# Patient Record
Sex: FEMALE | Employment: OTHER | URBAN - METROPOLITAN AREA
[De-identification: names, ages, dates, MRNs, and addresses within clinical notes are randomized per-mention and may not be internally consistent; named-entity substitution may affect disease eponyms.]

---

## 2018-11-21 ENCOUNTER — OFFICE VISIT (OUTPATIENT)
Dept: INTERNAL MEDICINE CLINIC | Age: 53
End: 2018-11-21

## 2018-11-21 VITALS
RESPIRATION RATE: 12 BRPM | TEMPERATURE: 97.2 F | OXYGEN SATURATION: 96 % | HEART RATE: 60 BPM | HEIGHT: 62 IN | BODY MASS INDEX: 41.59 KG/M2 | WEIGHT: 226 LBS | SYSTOLIC BLOOD PRESSURE: 155 MMHG | DIASTOLIC BLOOD PRESSURE: 95 MMHG

## 2018-11-21 DIAGNOSIS — Z11.59 NEED FOR HEPATITIS C SCREENING TEST: ICD-10-CM

## 2018-11-21 DIAGNOSIS — G57.11 MERALGIA PARESTHETICA OF RIGHT SIDE: ICD-10-CM

## 2018-11-21 DIAGNOSIS — E78.2 MIXED HYPERLIPIDEMIA: ICD-10-CM

## 2018-11-21 DIAGNOSIS — R03.0 TEMPORARY HIGH BLOOD PRESSURE: ICD-10-CM

## 2018-11-21 DIAGNOSIS — E66.01 OBESITY, CLASS III, BMI 40-49.9 (MORBID OBESITY) (HCC): ICD-10-CM

## 2018-11-21 DIAGNOSIS — Z13.0 SCREENING FOR DEFICIENCY ANEMIA: ICD-10-CM

## 2018-11-21 DIAGNOSIS — M54.32 SCIATICA OF LEFT SIDE: Primary | ICD-10-CM

## 2018-11-21 DIAGNOSIS — F32.A DEPRESSION, UNSPECIFIED DEPRESSION TYPE: ICD-10-CM

## 2018-11-21 RX ORDER — GABAPENTIN 300 MG/1
CAPSULE ORAL
Refills: 1 | COMMUNITY
Start: 2018-11-16 | End: 2018-11-21 | Stop reason: SDUPTHER

## 2018-11-21 RX ORDER — TIZANIDINE 2 MG/1
2 TABLET ORAL 3 TIMES DAILY
Qty: 45 TAB | Refills: 6 | Status: SHIPPED | OUTPATIENT
Start: 2018-11-21 | End: 2019-01-07 | Stop reason: SDUPTHER

## 2018-11-21 RX ORDER — VENLAFAXINE HYDROCHLORIDE 150 MG/1
150 CAPSULE, EXTENDED RELEASE ORAL DAILY
Qty: 90 CAP | Refills: 3 | Status: SHIPPED | OUTPATIENT
Start: 2018-11-21

## 2018-11-21 RX ORDER — VENLAFAXINE HYDROCHLORIDE 150 MG/1
CAPSULE, EXTENDED RELEASE ORAL
Refills: 0 | COMMUNITY
Start: 2018-11-16 | End: 2018-11-21 | Stop reason: SDUPTHER

## 2018-11-21 RX ORDER — GABAPENTIN 300 MG/1
600 CAPSULE ORAL 3 TIMES DAILY
Qty: 180 CAP | Refills: 5 | Status: SHIPPED | OUTPATIENT
Start: 2018-11-21

## 2018-11-21 NOTE — LETTER
Pepper Salinas :1965 MR #:[id number] 73 [facility] Page 1 of 5 CONTROLLED SUBSTANCE AGREEMENT I may be prescribed medications that are controlled substances as part  of my treatment plan for management of my medical condition(s). The goal of my treatment plan is to maintain and/or improve my health and wellbeing. Because controlled substances have an increased risk of abuse or harm, continual re-evaluation is needed determine if the goals of my treatment plan are being met for my safety and the safety of others. Bassam Alberts  am entering into this Controlled Substance Agreement with my provider, __________________________________ at [place] [address] . I understand that successful treatment requires mutual trust and honesty between me and my provider. I understand that there are state and federal laws and regulations which apply to the medications that my provider may prescribe that must be followed. I understand there are risks and benefits ts of taking the medicines that my provider may prescribe. I understand and agree that following this Agreement is necessary in continuing my provider-patient relationship and success of my treatment plan. As a part of my treatment plan, I agree to the following: COMMUNICATION: 
 
1. I will communicate fully with my provider about my medical condition(s), including the effect on my daily life and how well my medications are helping. I will tell my provider all of the medications that I take for any reason, including medications I receive from another health care provider, and will notify my provider about all issues, problems or concerns, including any side effects, which may be related to my medications. I understand that this information allows my provider to adjust my treatment plan to help manage my medical condition.  I understand that this information will become part of my permanent medical record. 2. I will notify my provider if I have a history of alcohol/drug misuse/addiction or if I have had treatment for alcohol/drug addiction in the past, or if I have a new problem with or concern about alcohol/drug use/addiction, because this increases the likelihood of high risk behaviors and may lead to serious medical conditions. 3. Females Only: I will notify my provider if I am or become pregnant, or if I intend to become pregnant, or if I intend to breastfeed. I understand that communication of these issues with my provider is important, due to possible effects my medication could have on an unborn fetus or breastfeeding child. Initials_____ Maximiliano Granda :1965 MR #:[id number] [address] [place] [address] Page 2 of 5 MISUSE OF MEDICATIONS / DRUGS: 
 
1. I agree to take all controlled substances as prescribed, and will not misuse or abuse any controlled substances prescribed by my provider. For my safety, I will not increase the amount of medicine I take without first talking with and getting permission from my provider. 2. If I have a medical emergency, another health care provider may prescribe me medication. If I seek emergency treatment, I will notify my provider within seventy-two (72) hours. 3. I understand that my provider may discuss my use and/or possible misuse/abuse of controlled substances and alcohol, as appropriate, with any health care provider involved in my care, pharmacist or legal authority. ILLEGAL DRUGS: 
 
1. I will not use illegal drugs of any kind, including but not limited to marijuana, heroin, cocaine, or any prescription drug which is not prescribed to me. DRUG DIVERSION / PRESCRIPTION FRAUD: 
 
1. I will not share, sell, trade, give away, or otherwise misuse my prescriptions or medications. 2. I will not alter any prescriptions provided to me by my provider. SINGLE PROVIDER: 
 
1. I agree that all controlled substances that I take will be prescribed only by my provider (or his/her covering provider) under this Agreement. This agreement does not prevent me from seeking emergency medical treatment or receiving pain management related to a surgery. PROTECTING MEDICATIONS: 
 
1. I am responsible for keeping my prescriptions and medications in a safe and secure place including safeguarding them from loss or theft. I understand that lost, stolen or damaged/destroyed prescriptions or medications will not be replaced. Initials____ Paulina Arellano :1965 MR #:[id number] [address] [place] [address] Page 3 of 5 PRESCRIPTION RENEWALS/REFILLS: 
 
1. I will follow my controlled substance medication schedule as prescribed by my provider. 2. I understand and agree that I will make any requests for renewals or refills of my prescriptions only at the time of an office visit or during my providers regular office hours subject to the prescription refill requirements of the individual practice. 3. I understand that my provider may not call in prescriptions for controlled substances to my pharmacy. 4. I understand that my provider may adjust or discontinue these medications as deemed appropriate for my medical treatment plan. This Agreement does not guarantee the prescription of controlled medications. 5. I agree that if my medications are adjusted or discontinued, I will properly dispose of any remaining medications. I understand that I will be required to dispose of any remaining controlled medications prior to being provided with any prescriptions for other controlled medications. 6. I understand that the renewal of my prescription depends on my medical condition, my consistent participation, and my adherence with my treatment plan and this Agreement.  
 
7. I understand that if I do not keep an appointment with my provider, I may not receive a renewal or refill for my controlled substance medication. PRESCRIPTION MONITORING / DRUG TESTIN. I understand that my provider may require me to provide urine, saliva or blood for testing at any time. I understand that this testing will be used to monitor for safety and adherence with my treatment plan and this Agreement. 2. I understand that my provider may ask me to provide an observed urine specimen, which means that a nurse or other health care provider may watch me provide urine, and I agree to cooperate if I am asked to provide an observed specimen. 3. I understand that if I do not provide urine, saliva or blood samples within two (2) hours of my providers request, or other timeframe decided by my provider, my treatment plan could be changed, or my prescriptions and medications may be changed or ended. 4. I understand that urine, saliva and blood test results will be a part of my permanent medical record. Initials_____ Griselda Vásquez :1965 MR #:[id number] 73 [facility] Page 4 of 5 
 
5. I understand that my provider is required to obtain a copy of my State Prescription Monitoring Program () Report at any time in order to safely prescribe medications. 6. I will bring all of my prescribed controlled substance medications in their original bottles to all of my scheduled appointments. 7. I understand that my provider may ask me to come to the practice with all of my prescribed medications for a random pill count at any time. I agree to cooperate if I am asked to come in for a random pill count. I understand that if I do not arrive in the timeframe decided by my provider, my treatment plan could be changed, or my prescriptions and medications may be changed or ended.  
 
COOPERATION WITH INVESTIGATIONS: 
 
1. I authorize my provider and my pharmacy to cooperate fully with any local, state, or federal law enforcement agency in the investigation of any possible misuse, sale, or other diversion of my controlled substance prescriptions or medications. RISKS: 
 
1. I understand that my level of consciousness may be affected from the use of controlled substances, and I understand that there are risks, benefits, effects and potential alternatives (including no treatment) to the medications that my provider has prescribed. 2. I understand that I may become drowsy, tired, dizzy, constipated, and sick to my stomach, or have changes in my mood or in my sleep while taking my medications. I have talked with my provider about these possible side effects, risks, benefits, and alternative treatments, and my provider has answered all of my questions. 3. I understand that I should not suddenly stop taking my medications without first speaking with my provider. I understand that if I suddenly stop taking my medications, I may experience nausea, vomiting, sweating,anxiety, sleeplessness, itching or other uncomfortable feelings. 4. I will not take my medications with alcohol of any kind, including beer, wine or liquor. I understand that drinking alcohol with my medications increases the chances of side effects, including breathing problems or even death. 5. I understand that if I have a history of alcoholism or other drug addiction I may be at increased risk of addiction to my medications. Signs of addiction might include craving, compulsive use, and continued use despite harm. Since addiction is a disease, I understand my provider may decide to change my medications and refer me to appropriate treatment services. I understand that this information would become part of my permanent medical record. Initials_____ Pepperantwan Serranoting :1965 MR #:[id number] 73 [facility] Page 5 of 5  
 
 
6.  Females only: Children born to women who regularly take controlled substances are likely to have physical problems and suffer withdrawal symptoms at birth. If I am of child-bearing age, I understand that I should use safe and effective birth control while taking any controlled substances to avoid the impact of medications on an unborn fetus or  child. I agree to notify my provider immediately if I should become pregnant so that my treatment plan can be adjusted. 7. Males only: I understand that chronic use of controlled substances has been associated with low testosterone levels in males which may affect my mood, stamina, sexual desire, and general health. I understand that my provider may order the appropriate laboratory test to determine my testosterone level,and I agree to this testing. ADHERENCE: 
 
1. I understand that if I do not adhere to this Agreement in any way, my provider may change my prescriptions, stop prescribing controlled substances or end our provider-patient relationship. 2. If my provider decides to stop prescribing medication, or decides to end our provider-patient relationship,my provider may require that I taper my medications slowly. If necessary, my provider may also provide a prescription for other medications to treat my withdrawal symptoms. UNDERSTANDING THIS AGREEMENT: 
 
I understand that my provider may adjust or stop my prescriptions for controlled substances based on my medical condition and my treatment plan. I understand that this Agreement does not guarantee that I will be prescribed medications or controlled substances. I understand that controlled substances may be just one part 
of my treatment plan. My initial on each page and my signature below shows that I have read each page of this Agreement, I have had an opportunity to ask questions, and all of my questions have been answered to my satisfaction by my provider.  
 
By signing below, I agree to comply with this Agreement, and I understand that if I do not follow the Agreements listed above, my provider may stop 
 
_________________________________________  Date/Time 11/21/2018 12:57 PM   
             (Patient Signature) 
 
________________________________________    Date/Time 11/21/2018 12:57 PM 
 (Parent or Guardian Signature if <18 yrs) 
 
_________________________________________ Date/Time 11/21/2018 12:57 PM 
 (Provider Signature)

## 2018-11-21 NOTE — PATIENT INSTRUCTIONS
Body Mass Index: Care Instructions Your Care Instructions Body mass index (BMI) can help you see if your weight is raising your risk for health problems. It uses a formula to compare how much you weigh with how tall you are. · A BMI lower than 18.5 is considered underweight. · A BMI between 18.5 and 24.9 is considered healthy. · A BMI between 25 and 29.9 is considered overweight. A BMI of 30 or higher is considered obese. If your BMI is in the normal range, it means that you have a lower risk for weight-related health problems. If your BMI is in the overweight or obese range, you may be at increased risk for weight-related health problems, such as high blood pressure, heart disease, stroke, arthritis or joint pain, and diabetes. If your BMI is in the underweight range, you may be at increased risk for health problems such as fatigue, lower protection (immunity) against illness, muscle loss, bone loss, hair loss, and hormone problems. BMI is just one measure of your risk for weight-related health problems. You may be at higher risk for health problems if you are not active, you eat an unhealthy diet, or you drink too much alcohol or use tobacco products. Follow-up care is a key part of your treatment and safety. Be sure to make and go to all appointments, and call your doctor if you are having problems. It's also a good idea to know your test results and keep a list of the medicines you take. How can you care for yourself at home? · Practice healthy eating habits. This includes eating plenty of fruits, vegetables, whole grains, lean protein, and low-fat dairy. · If your doctor recommends it, get more exercise. Walking is a good choice. Bit by bit, increase the amount you walk every day. Try for at least 30 minutes on most days of the week. · Do not smoke. Smoking can increase your risk for health problems.  If you need help quitting, talk to your doctor about stop-smoking programs and medicines. These can increase your chances of quitting for good. · Limit alcohol to 2 drinks a day for men and 1 drink a day for women. Too much alcohol can cause health problems. If you have a BMI higher than 25 · Your doctor may do other tests to check your risk for weight-related health problems. This may include measuring the distance around your waist. A waist measurement of more than 40 inches in men or 35 inches in women can increase the risk of weight-related health problems. · Talk with your doctor about steps you can take to stay healthy or improve your health. You may need to make lifestyle changes to lose weight and stay healthy, such as changing your diet and getting regular exercise. If you have a BMI lower than 18.5 · Your doctor may do other tests to check your risk for health problems. · Talk with your doctor about steps you can take to stay healthy or improve your health. You may need to make lifestyle changes to gain or maintain weight and stay healthy, such as getting more healthy foods in your diet and doing exercises to build muscle. Where can you learn more? Go to http://mu-matt.info/. Enter S176 in the search box to learn more about \"Body Mass Index: Care Instructions. \" Current as of: June 26, 2018 Content Version: 11.8 © 0886-4076 [employer]. Care instructions adapted under license by [employer] (which disclaims liability or warranty for this information). If you have questions about a medical condition or this instruction, always ask your healthcare professional. [address] any warranty or liability for your use of this information.

## 2018-11-21 NOTE — PROGRESS NOTES
INTERNISTS OF [facility]: 
12/2/2018, MRN: [id number] Walter Gastelum is a 52 y.o. female and presents to clinic to Establish Care; Medication Refill; and Depression Subjective: The pt is a 49yo with h/o obesity, depression, meralgia paresthetica (right), migraine HAs, tension HAs, left sciatica, and breast microcalcifications. 1. Depression: Present for yrs. She cries and is \"campbell\" if she misses a dose of Effexor. She's been on this rx x 10 yrs. No h/o suicidal ideation. She has never been hospitalized for depression. She used to take Zoloft but it was not effective in regards to controlling her sx. Her depression sx were associated with her menses. Later in life, she lost both parents within a 2 month period; her  eventually lost his job. Eventually her  passed away in 2012. Her children moved away and her grandchildren are in foster care. She lives with her niece and her . She cares for their kids. No tobacco/ETOH. No drug use. No energy drinks. She is not working. She identifies as a Shinto. She has yet to find a Muslim family. 2. Meralgia Paresthetica and Sciatica: She had a right knee replacement surgery last yr. Soon after she developed burning pain along her anterolateral right thigh. She had an EMG test that showed the \"pain is coming from the hip and not the back. \" She was placed on gabapentin. Sx were well controlled. 2-3 months ago, she developed lower back pain off/on that radiates down her LLE. No h/o trauma. Lower back pain occurs only with lying flat. No paresthesias. No weakness. No incontinence. No other alleviating factors are known. She has tried exedrin but this did not help her sx. No adverse side effects from this rx. Pain along her lower back/LLE w/o gabapentin is about 6/10. No drug use. 3. HAs: She gets 2-3 HAs per months. She's had tension HAs and migraine HAs for yrs. She has relief of sx with zanaflex.  She has bifrontal/temporal pain. No adverse side effects from the zanaflex rx but she has never taken it in the setting of gabapentin. No vision changes. She had her formal eye exam within the past yr. It was unremarkable per pt hx. She sleeps but wakes up every 2-3 hours 2/2 pain from #2. +Snoring hx but \"not loudly\" per family members. No emesis/nausea. No hearing loss. No auras. No triggers are known but they can occur more frequency in warmer months. 4. Health Maintenance: 
- Last PAP: S/p hysterectomy 2007 2/2 heavy bleeding. No vaginal bleeding.  
- Last mammogram: Last year and unremarkable per pt hx but she was told she has \"calcifications\" in one of her breasts. No breast pain/masses. - Colon cancer screening: She does the FIT test. It has never been abnormal per her hx. It was done within the past year. No hematochezia/melena. She has never had a colonoscopy and prefers not to have this done in place of the FIT test.  
- Diet: \"it's not good. \" Not a vegan/vegetarian. 
- It's been over a yr since she has had labs drawn. She is not on cholesterol lowering rx but states that her cholesterol was borderline elevated when checked in the past.  
- Exercise: She does not regularly exercise - Tobacco use: None 
- ETOH use: None - Drug use: None - Energy drink consumption: None - She moved from [place] to [place] in June 2018. 4. Temporary High Blood Pressure: She was diagnosed with HTN in the past but was taken off of rx. She used to be on cholesterol lowering rx but she was taken off this rx. She reports gaining weight within the past few months. Patient Active Problem List  
 Diagnosis Date Noted  Chronic migraine without aura without status migrainosus, not intractable 12/02/2018  Intractable episodic tension-type headache 12/02/2018  Meralgia paresthetica of right side 12/02/2018  Left sided sciatica 12/02/2018  Moderate episode of recurrent major depressive disorder ([facility] [address] 12/02/2018  Obesity, Class III, BMI 40-49.9 (morbid obesity) ([facility] [address] 12/02/2018  Mixed hyperlipidemia 12/02/2018 Current Outpatient Medications Medication Sig Dispense Refill  tiZANidine (ZANAFLEX) 2 mg tablet Take 1 Tab by mouth three (3) times daily. 45 Tab 6  
 gabapentin (NEURONTIN) 300 mg capsule Take 2 Caps by mouth three (3) times daily. 180 Cap 5  
 venlafaxine-SR (EFFEXOR-XR) 150 mg capsule Take 1 Cap by mouth daily. 90 Cap 3 Allergies Allergen Reactions  Vancomycin Hives and Rash  Avelox [Moxifloxacin] Hives Past Medical History:  
Diagnosis Date  Abnormal EKG  Arrhythmia  Depression  Headache  Hx of migraine headaches  Hypercholesterolemia  Hypertension  Insomnia Past Surgical History:  
Procedure Laterality Date  HX ADENOIDECTOMY  HX HYSTERECTOMY  2007  HX KNEE REPLACEMENT  2017  
 right  HX SEPTOPLASTY  2007  HX WISDOM TEETH EXTRACTION    
 x4 Family History Problem Relation Age of Onset [facility] Arthritis-osteo Mother  Stroke Mother  Glaucoma Mother  COPD Father  No Known Problems Sister  Elevated Lipids Brother  Macular Degen Brother  Depression Brother  Anemia Daughter Social History Tobacco Use  Smoking status: Never Smoker  Smokeless tobacco: Never Used Substance Use Topics  Alcohol use: No  
  Frequency: Never ROS Review of Systems Constitutional: Negative for chills and fever. HENT: Negative for ear pain and sore throat. Eyes: Negative for blurred vision and pain. Respiratory: Negative for cough and shortness of breath. Cardiovascular: Negative for chest pain. Gastrointestinal: Negative for abdominal pain, blood in stool and melena. Genitourinary: Negative for dysuria and hematuria. Musculoskeletal: Positive for back pain and joint pain. Negative for myalgias. Skin: Negative for rash. Neurological: Positive for tingling and headaches. Negative for focal weakness. Endo/Heme/Allergies: Does not bruise/bleed easily. Psychiatric/Behavioral: Positive for depression (see HPI). Negative for substance abuse and suicidal ideas. Objective Vitals:  
 11/21/18 1145 BP: (!) 155/95 Pulse: 60 Resp: 12 Temp: 97.2 °F (36.2 °C) TempSrc: Oral  
SpO2: 96% Weight: 226 lb (102.5 kg) Height: 5' 2.21\" (1.58 m) PainSc:   0 - No pain Physical Exam  
Constitutional: She is oriented to person, place, and time and well-developed, well-nourished, and in no distress. HENT:  
Head: Normocephalic and atraumatic. Right Ear: External ear normal.  
Left Ear: External ear normal.  
Nose: Nose normal.  
Mouth/Throat: Oropharynx is clear and moist. No oropharyngeal exudate. Eyes: Conjunctivae and EOM are normal. Pupils are equal, round, and reactive to light. Right eye exhibits no discharge. Left eye exhibits no discharge. No scleral icterus. Neck: Neck supple. Cardiovascular: Normal rate, regular rhythm, normal heart sounds and intact distal pulses. Exam reveals no gallop and no friction rub. No murmur heard. Pulmonary/Chest: Effort normal and breath sounds normal. No respiratory distress. She has no wheezes. She has no rales. Abdominal: Soft. Bowel sounds are normal. She exhibits no distension. There is no tenderness. There is no rebound and no guarding. Musculoskeletal: She exhibits no edema or tenderness (Bue). All spinous processes are nontender to palpation, except along her lumbar spine. No paraspinal muscles are tender to palpation. She has some discomfort with bilateral hip flexion/extension/rotation exercises. Her right thigh has altered sensation compared to her left thigh. Lymphadenopathy:  
  She has no cervical adenopathy. Neurological: She is alert and oriented to person, place, and time. She exhibits normal muscle tone.  Gait normal.  
 Positive left-sided straight leg raise test.  
Skin: Skin is warm and dry. No erythema. Psychiatric: Affect normal.  
Nursing note and vitals reviewed. Assessment/Plan: 1. Mixed hyperlipidemia Checking a BMP, lipid panel, and A1c. 
I encouraged her to reduce her weight by lowering her process food intake. I will recheck her weight at her follow-up appointment. ORDERS: 
- METABOLIC PANEL, BASIC; Future - LIPID PANEL; Future 
- HEMOGLOBIN A1C W/O EAG; Future 2. Health Maintenance: Screen for anemia with a CBC. Requesting previous PCP records, her vaccine records, and cancer screening records.  Screening for hepatitis C per CDC recommendations based on her date of birth. - She declined getting the flu vaccine today ORDERS: 
- CBC WITH AUTOMATED DIFF; Future 
- HEPATITIS C AB; Future 3. Right Meralgia Paresthetica and Sciatica of left side:  
Placing a referral to Orthopedics. Activity as tolerated.  Refilling her gabapentin and tizanidine. A controlled substance agreement was completed today. I instructed her to stop these medications and notify me if she develops any adverse side effects while taking them. ORDERS: 
- REFERRAL TO ORTHOPEDICS 
- XR SPINE LUMB COMP W BEND; Future - tiZANidine (ZANAFLEX) 2 mg tablet; Take 1 Tab by mouth three (3) times daily. Dispense: 45 Tab; Refill: 6 
- gabapentin (NEURONTIN) 300 mg capsule; Take 2 Caps by mouth three (3) times daily. Dispense: 180 Cap; Refill: 5 
 
4. Depression: Stable. Refilling her Effexor. I encouraged her in order for underlying belief system as a means to reduce her stress. ORDERS: 
- venlafaxine-SR (EFFEXOR-XR) 150 mg capsule; Take 1 Cap by mouth daily. Dispense: 90 Cap; Refill: 3 
 
5. Temporary Elevated BP: 
- RTC for a BP check. If her BP is >140/90, I will need to order antihypertensive rx. Health Maintenance Due Topic Date Due  
 Hepatitis C Screening  1965  DTaP/Tdap/Td series (1 - Tdap) 03/07/1984  Shingrix Vaccine Age 50> (1 of 2) 03/07/2013  BREAST CANCER SCRN MAMMOGRAM  03/07/2013  FOBT Q 1 YEAR AGE 50-75  03/07/2013  Influenza Age 5 to Adult  08/01/2018 I have discussed the diagnosis with the patient and the intended plan as seen in the above orders. The patient has received an after-visit summary and questions were answered concerning future plans. I have discussed medication side effects and warnings with the patient as well. I have reviewed the plan of care with the patient, accepted their input and they are in agreement with the treatment goals. All questions were answered. The patient understands the plan of care. Handouts provided today with above information. Pt instructed if symptoms worsen to call the office or report to the ED for continued care. Greater than 50% of the visit time was spent in counseling and/or coordination of care. Voice recognition was used to generate this report, which may have resulted in some phonetic based errors in grammar and contents. Even though attempts were made to correct all the mistakes, some may have been missed, and remained in the body of the document. Follow-up Disposition: 
Return in about 7 weeks (around 1/9/2019) for sciatica, chronic pain, depression, lab results. Stacey Oconnell MD

## 2018-11-21 NOTE — PROGRESS NOTES
Chief Complaint Patient presents with [facility] Establish Care  Medication Refill  Depression 1. Have you been to the ER, urgent care clinic since your last visit? Hospitalized since your last visit? No 
 
2. Have you seen or consulted any other health care providers outside of the [address] [place] [address] since your last visit? Include any pap smears or colon screening. No 
 
Patient was given a copy of the Advanced Directive and understands to bring it in once completed. Health Maintenance Due Topic Date Due  
 Hepatitis C Screening  1965  DTaP/Tdap/Td series (1 - Tdap) 03/07/1984  PAP AKA CERVICAL CYTOLOGY  03/07/1984  Shingrix Vaccine Age 50> (1 of 2) 03/07/2013  BREAST CANCER SCRN MAMMOGRAM  03/07/2013  FOBT Q 1 YEAR AGE 50-75  03/07/2013  Influenza Age 5 to Adult  08/01/2018

## 2018-12-02 PROBLEM — G57.11 MERALGIA PARESTHETICA OF RIGHT SIDE: Status: ACTIVE | Noted: 2018-12-02

## 2018-12-02 PROBLEM — E78.2 MIXED HYPERLIPIDEMIA: Status: ACTIVE | Noted: 2018-12-02

## 2018-12-02 PROBLEM — G44.211 INTRACTABLE EPISODIC TENSION-TYPE HEADACHE: Status: ACTIVE | Noted: 2018-12-02

## 2018-12-02 PROBLEM — F33.1 MODERATE EPISODE OF RECURRENT MAJOR DEPRESSIVE DISORDER (HCC): Status: ACTIVE | Noted: 2018-12-02

## 2018-12-02 PROBLEM — M54.32 LEFT SIDED SCIATICA: Status: ACTIVE | Noted: 2018-12-02

## 2018-12-02 PROBLEM — E66.01 OBESITY, CLASS III, BMI 40-49.9 (MORBID OBESITY) (HCC): Status: ACTIVE | Noted: 2018-12-02

## 2018-12-02 PROBLEM — F32.A DEPRESSION: Status: ACTIVE | Noted: 2018-12-02

## 2018-12-02 PROBLEM — G43.709 CHRONIC MIGRAINE WITHOUT AURA WITHOUT STATUS MIGRAINOSUS, NOT INTRACTABLE: Status: ACTIVE | Noted: 2018-12-02

## 2018-12-05 ENCOUNTER — TELEPHONE (OUTPATIENT)
Dept: INTERNAL MEDICINE CLINIC | Age: 53
End: 2018-12-05

## 2018-12-05 ENCOUNTER — HOSPITAL ENCOUNTER (OUTPATIENT)
Dept: GENERAL RADIOLOGY | Age: 53
Discharge: HOME | End: 2018-12-05
Payer: COMMERCIAL

## 2018-12-05 ENCOUNTER — APPOINTMENT (OUTPATIENT)
Dept: INTERNAL MEDICINE CLINIC | Age: 53
End: 2018-12-05

## 2018-12-05 DIAGNOSIS — M54.32 SCIATICA OF LEFT SIDE: ICD-10-CM

## 2018-12-05 PROCEDURE — 72114 X-RAY EXAM L-S SPINE BENDING: CPT

## 2018-12-05 NOTE — TELEPHONE ENCOUNTER
----- Message from Emmett Barthel, MD sent at 12/5/2018 11:57 AM EST -----  Please let her know that her lumbar xray shows some curvature along her lower spine (scoliosis). She also has findings consistent with underlying arthritis and disc disease. She needs to f/u with Orthopedics as discussed at her last office visit.     Dr. Merced Mcintosh  [facility], [address] [place] [address] Str.  Phone: [phone]  Fax: [phone]

## 2018-12-05 NOTE — TELEPHONE ENCOUNTER
----- Message from Paul Clarke MD sent at 12/5/2018 11:57 AM EST -----  Please let her know that her lumbar xray shows some curvature along her lower spine (scoliosis). She also has findings consistent with underlying arthritis and disc disease. She needs to f/u with Orthopedics as discussed at her last office visit.     Dr. Bettina Fitzpatrick  [facility] of [address], [address] [place] [address] Str.  Phone: [phone]  Fax: [phone]

## 2018-12-05 NOTE — TELEPHONE ENCOUNTER
Chief Complaint   Patient presents with    Results     done 12-05-18 Xray Lumbar Complete with BEND per Dr Duval Mems     Left voice message for the patient to return my call.

## 2018-12-05 NOTE — PROGRESS NOTES
Please let her know that her lumbar xray shows some curvature along her lower spine (scoliosis). She also has findings consistent with underlying arthritis and disc disease. She needs to f/u with Orthopedics as discussed at her last office visit.     Dr. Fransisco Johns  [facility] of [address]  Phone: [phone]  Fax: [phone]

## 2018-12-06 LAB
ABSOLUTE LYMPHOCYTE COUNT, 10803: 2 K/UL (ref 1–4.8)
ANION GAP SERPL CALC-SCNC: 17 MMOL/L
AVG GLU, 10930: 108 MG/DL (ref 91–123)
BASOPHILS # BLD: 0 K/UL (ref 0–0.2)
BASOPHILS NFR BLD: 1 % (ref 0–2)
BUN SERPL-MCNC: 9 MG/DL (ref 6–22)
CALCIUM SERPL-MCNC: 9.4 MG/DL (ref 8.4–10.5)
CHLORIDE SERPL-SCNC: 99 MMOL/L (ref 98–110)
CHOLEST SERPL-MCNC: 270 MG/DL (ref 110–200)
CO2 SERPL-SCNC: 26 MMOL/L (ref 20–32)
CREAT SERPL-MCNC: 0.8 MG/DL (ref 0.5–1.2)
EOSINOPHIL # BLD: 0.3 K/UL (ref 0–0.5)
EOSINOPHIL NFR BLD: 5 % (ref 0–6)
ERYTHROCYTE [DISTWIDTH] IN BLOOD BY AUTOMATED COUNT: 13.5 % (ref 10–15.5)
GFRAA, 66117: >60
GFRNA, 66118: >60
GLUCOSE SERPL-MCNC: 93 MG/DL (ref 70–99)
GRANULOCYTES,GRANS: 59 % (ref 40–75)
HBA1C MFR BLD HPLC: 5.4 % (ref 4.8–5.9)
HCT VFR BLD AUTO: 48.2 % (ref 35.1–48)
HCV AB SER IA-ACNC: NORMAL
HDLC SERPL-MCNC: 4.6 MG/DL (ref 0–5)
HDLC SERPL-MCNC: 59 MG/DL (ref 40–59)
HGB BLD-MCNC: 14.9 G/DL (ref 11.7–16)
LDLC SERPL CALC-MCNC: 169 MG/DL (ref 50–99)
LYMPHOCYTES, LYMLT: 31 % (ref 20–45)
MCH RBC QN AUTO: 29 PG (ref 26–34)
MCHC RBC AUTO-ENTMCNC: 31 G/DL (ref 31–36)
MCV RBC AUTO: 93 FL (ref 80–95)
MONOCYTES # BLD: 0.3 K/UL (ref 0.1–1)
MONOCYTES NFR BLD: 5 % (ref 3–12)
NEUTROPHILS # BLD AUTO: 3.9 K/UL (ref 1.8–7.7)
PLATELET # BLD AUTO: 270 K/UL (ref 140–440)
PMV BLD AUTO: 11.1 FL (ref 9–13)
POTASSIUM SERPL-SCNC: 4.5 MMOL/L (ref 3.5–5.5)
RBC # BLD AUTO: 5.21 M/UL (ref 3.8–5.2)
SODIUM SERPL-SCNC: 142 MMOL/L (ref 133–145)
TRIGL SERPL-MCNC: 213 MG/DL (ref 40–149)
VLDLC SERPL CALC-MCNC: 43 MG/DL (ref 8–30)
WBC # BLD AUTO: 6.6 K/UL (ref 4–11)

## 2018-12-06 NOTE — TELEPHONE ENCOUNTER
Chief Complaint   Patient presents with    Results     done 12-05-18 Xray Lumbar Complete with BEND per Dr Irina Eid     Patient reached and informed of results, she states she has already made her Orthopedic Spine appointment for 1-23-18, and understands to follow up with their facility.

## 2018-12-14 ENCOUNTER — TELEPHONE (OUTPATIENT)
Dept: INTERNAL MEDICINE CLINIC | Age: 53
End: 2018-12-14

## 2018-12-14 NOTE — PROGRESS NOTES
Please let her know that her renal function is normal. Her triglycerides are 213. Her HDL is 59. Her total cholesterol is 270. Her LDL is 169. She should be on cholesterol lowering medication - which we can discuss when she returns to clinic. Meanwhile, she does not have hepatitis C infection. Her blood counts are normal. Her labs show no evidence of diabetes/prediabetes at this time.     Dr. Verónica Herrera  [facility], [address] [place] [address], [address] [place] [address] Str.  Phone: [phone]  Fax: [phone]

## 2018-12-14 NOTE — TELEPHONE ENCOUNTER
----- Message from Davon Kirkland MD sent at 12/14/2018  3:51 AM EST -----  Please let her know that her renal function is normal. Her triglycerides are 213. Her HDL is 59. Her total cholesterol is 270. Her LDL is 169. She should be on cholesterol lowering medication - which we can discuss when she returns to clinic. Meanwhile, she does not have hepatitis C infection. Her blood counts are normal. Her labs show no evidence of diabetes/prediabetes at this time.     Dr. Arun Finney  [facility] of [address], [address] [place] [address] Str.  Phone: [phone]  Fax: [phone]

## 2018-12-17 NOTE — TELEPHONE ENCOUNTER
Patient contacted, patient identified with two identifiers (Name & ). Patient given results per DR. Alpesh Mendoza.

## 2018-12-19 ENCOUNTER — TELEPHONE (OUTPATIENT)
Dept: INTERNAL MEDICINE CLINIC | Age: 53
End: 2018-12-19

## 2018-12-19 NOTE — TELEPHONE ENCOUNTER
Chief Complaint   Patient presents with    Back Pain     per DR Rachel Powell the patient is to contact Orthopedics for additional Treatment      Per Dr Rachel Powell the patient was reached, and informed she needs to contact the Orthopedic Specialist for evaluation and treatment. The patient stated she has an appointment but not until 01-23-19, and advised to reach out, and call their office daily to see if there are any openings, and that they will try to put her in those openings if available, but she would have to be diligent in calling their office for an earlier appointment. All understood.

## 2018-12-19 NOTE — TELEPHONE ENCOUNTER
Please have her contact Orthopedics for additional treatment recommendations. She is already on a good dose of gabapentin and tizanidine. If NSAIDs are not helping to relieve any break-through pain, she needs to f/u with Orthopedics as was discussed at her last apt.     Dr. Maty Laura  [facility] of [address] [place] [address], [address] [place] [address], [address] [place] [address] Str.  Phone: [phone]  Fax: [phone]

## 2019-01-07 ENCOUNTER — OFFICE VISIT (OUTPATIENT)
Dept: INTERNAL MEDICINE CLINIC | Age: 54
End: 2019-01-07

## 2019-01-07 VITALS
DIASTOLIC BLOOD PRESSURE: 97 MMHG | SYSTOLIC BLOOD PRESSURE: 160 MMHG | WEIGHT: 232 LBS | BODY MASS INDEX: 42.69 KG/M2 | TEMPERATURE: 98.3 F | HEIGHT: 62 IN | OXYGEN SATURATION: 95 % | HEART RATE: 86 BPM | RESPIRATION RATE: 12 BRPM

## 2019-01-07 DIAGNOSIS — Z12.31 ENCOUNTER FOR SCREENING MAMMOGRAM FOR BREAST CANCER: ICD-10-CM

## 2019-01-07 DIAGNOSIS — E78.2 MIXED HYPERLIPIDEMIA: ICD-10-CM

## 2019-01-07 DIAGNOSIS — I10 ESSENTIAL HYPERTENSION: Primary | ICD-10-CM

## 2019-01-07 DIAGNOSIS — G43.709 CHRONIC MIGRAINE WITHOUT AURA WITHOUT STATUS MIGRAINOSUS, NOT INTRACTABLE: ICD-10-CM

## 2019-01-07 DIAGNOSIS — M54.32 SCIATICA OF LEFT SIDE: ICD-10-CM

## 2019-01-07 RX ORDER — AMLODIPINE BESYLATE 5 MG/1
5 TABLET ORAL DAILY
Qty: 30 TAB | Refills: 6 | Status: SHIPPED | OUTPATIENT
Start: 2019-01-07

## 2019-01-07 RX ORDER — PRAVASTATIN SODIUM 20 MG/1
20 TABLET ORAL
Qty: 30 TAB | Refills: 6 | Status: SHIPPED | OUTPATIENT
Start: 2019-01-07 | End: 2019-07-25 | Stop reason: SDUPTHER

## 2019-01-07 RX ORDER — TIZANIDINE 4 MG/1
4 TABLET ORAL 3 TIMES DAILY
Qty: 45 TAB | Refills: 6 | Status: SHIPPED | OUTPATIENT
Start: 2019-01-07

## 2019-01-07 NOTE — PROGRESS NOTES
INTERNISTS OF [facility]: 
1/8/2019, MRN: [id number] Charmayne Loge is a 52 y.o. female and presents to clinic for Elevated Blood Pressure (follow up) and Labs (done 12-05-18 ) Subjective: The pt is a 49yo with h/o obesity, depression (not responsive to zoloft), meralgia paresthetica (right), migraine HAs, HLD, allergic rhinitis tension HAs, left sciatica, and breast microcalcifications. 
  
1. Cough: Present for 1-2 weeks. Other family members have had similar symptoms. She is living with her niece and her niece's , taking care of their children. She has a productive cough with white sputum. Sometimes it is clear. She has no shortness of breath. Symptoms are worse at night. She denies fever and chills. She has chronic allergic rhinitis and was followed by an allergy doctor. No alleviating factors are known. 2.  Hypertension: Her blood pressure at her last visit was elevated at 155/95. It is elevated today as well at 160/97 without medication. Her weight is 232 pounds. She is not regularly exercising or dieting. She used to take blood pressure medication. 3. H/o Migraine HAs and Chronic Back Pain: Present for years. She was diagnosed with migraine and tension headaches in the past.  She has significant relief of symptoms with Zanaflex. She responds best to 4 mg of Zanaflex at bedtime. She reports no adverse side effects with taking this medication. No drug use. No energy drink consumption. Headaches are bifrontal/temporal in location. Pain is most prominent along the left side of her neck. She denies vision changes. No hearing deficits. No triggers are known for her headaches. She gets to 3 headaches on average per month. She is asking for her tizanidine to be increased to 4 mg at bedtime. She also has a history of meralgia paresthetica and sciatica.   She has history of a burning anterolateral right thigh pain thought to be secondary to right meralgia paresthetica. She also has a history of disc disease per her most recent x-ray findings. She has chronic back pain along her lower back that radiates down her left lower extremity. She is scheduled to meet with Orthopedics. She is taking gabapentin and reports no adverse side effects with taking this medication. 4. Health Maintenance: 
- Last mammogram: Done >1 yr ago per her hx. She was told that she has \"calcifications\" in one of her breasts. No breast pain/masses. - Colon cancer screening: She does the FIT test. It has never been abnormal per her hx. It was done within the past year. No hematochezia/melena. She has never had a colonoscopy and prefers not to have this done in place of the FIT test.  
 
5.  Hyperlipidemia: Her most recent labs show an elevated LDL of 169. She is not on any cholesterol-lowering medication at this time. Patient Active Problem List  
 Diagnosis Date Noted  Chronic migraine without aura without status migrainosus, not intractable 12/02/2018  Intractable episodic tension-type headache 12/02/2018  Meralgia paresthetica of right side 12/02/2018  Left sided sciatica 12/02/2018  Moderate episode of recurrent major depressive disorder ([facility] [address] 12/02/2018  Obesity, Class III, BMI 40-49.9 (morbid obesity) ([facility] [address] 12/02/2018  Mixed hyperlipidemia 12/02/2018 Current Outpatient Medications Medication Sig Dispense Refill  amLODIPine (NORVASC) 5 mg tablet Take 1 Tab by mouth daily. 30 Tab 6  tiZANidine (ZANAFLEX) 4 mg tablet Take 1 Tab by mouth three (3) times daily. 45 Tab 6  pravastatin (PRAVACHOL) 20 mg tablet Take 1 Tab by mouth nightly. For high cholesterol 30 Tab 6  
 gabapentin (NEURONTIN) 300 mg capsule Take 2 Caps by mouth three (3) times daily. 180 Cap 5  
 venlafaxine-SR (EFFEXOR-XR) 150 mg capsule Take 1 Cap by mouth daily. 90 Cap 3 Allergies Allergen Reactions  Vancomycin Hives and Rash  Avelox [Moxifloxacin] Hives Past Medical History:  
Diagnosis Date  Abnormal EKG  Arrhythmia  Depression  Headache  Hx of migraine headaches  Hypercholesterolemia  Hypertension  Insomnia Past Surgical History:  
Procedure Laterality Date  HX ADENOIDECTOMY  HX HYSTERECTOMY  2007  HX KNEE REPLACEMENT  2017  
 right  HX SEPTOPLASTY  2007  HX WISDOM TEETH EXTRACTION    
 x4 Family History Problem Relation Age of Onset [facility] Arthritis-osteo Mother  Stroke Mother  Glaucoma Mother  COPD Father  No Known Problems Sister  Elevated Lipids Brother  Macular Degen Brother  Depression Brother  Anemia Daughter Social History Tobacco Use  Smoking status: Never Smoker  Smokeless tobacco: Never Used Substance Use Topics  Alcohol use: No  
  Frequency: Never ROS Review of Systems Constitutional: Negative for chills and fever. HENT: Negative for ear pain and sore throat. Eyes: Negative for blurred vision and pain. Respiratory: Positive for cough and sputum production. Negative for shortness of breath. Cardiovascular: Negative for chest pain. Gastrointestinal: Negative for abdominal pain, blood in stool and melena. Genitourinary: Negative for dysuria and hematuria. Musculoskeletal: Positive for back pain (lower), myalgias and neck pain. Negative for joint pain. Skin: Negative for rash. Neurological: Positive for tingling (LLE) and headaches (see HPI). Negative for focal weakness. Endo/Heme/Allergies: Positive for environmental allergies. Does not bruise/bleed easily. Psychiatric/Behavioral: Negative for substance abuse. Objective Vitals:  
 01/07/19 1201 01/07/19 1202 01/07/19 1212 BP:  (!) 153/94 (!) 160/97 Pulse:  88 86 Resp:  14 12 Temp:  98.3 °F (36.8 °C) TempSrc:  Oral   
SpO2:  95% Weight:  232 lb (105.2 kg) Height:  5' 2.21\" (1.58 m) PainSc:   0 - No pain   0 - No pain Physical Exam  
Constitutional: She is oriented to person, place, and time and well-developed, well-nourished, and in no distress. HENT:  
Head: Normocephalic and atraumatic. Right Ear: External ear normal.  
Left Ear: External ear normal.  
Nose: Nose normal.  
Mouth/Throat: Oropharynx is clear and moist. No oropharyngeal exudate. Clear TMs Eyes: Conjunctivae and EOM are normal. Right eye exhibits no discharge. Left eye exhibits no discharge. No scleral icterus. Neck: Neck supple. Cardiovascular: Normal rate, regular rhythm, normal heart sounds and intact distal pulses. Exam reveals no gallop and no friction rub. No murmur heard. Pulmonary/Chest: Effort normal and breath sounds normal. No respiratory distress. She has no wheezes. She has no rales. Abdominal: Soft. Bowel sounds are normal. She exhibits no distension. There is no tenderness. There is no rebound and no guarding. Musculoskeletal: She exhibits no edema or tenderness (Bue). Lymphadenopathy:  
  She has no cervical adenopathy. Neurological: She is alert and oriented to person, place, and time. She exhibits normal muscle tone. Gait normal.  
Skin: Skin is warm and dry. No erythema. Psychiatric: Affect normal.  
Nursing note and vitals reviewed. LABS Data Review:  
Lab Results Component Value Date/Time WBC 6.6 12/05/2018 09:59 AM  
 HGB 14.9 12/05/2018 09:59 AM  
 HCT 48.2 (H) 12/05/2018 09:59 AM  
 PLATELET 097 58/56/1495 09:59 AM  
 MCV 93 12/05/2018 09:59 AM  
 
 
Lab Results Component Value Date/Time Sodium 142 12/05/2018 09:59 AM  
 Potassium 4.5 12/05/2018 09:59 AM  
 Chloride 99 12/05/2018 09:59 AM  
 CO2 26 12/05/2018 09:59 AM  
 Anion gap 17.0 12/05/2018 09:59 AM  
 Glucose 93 12/05/2018 09:59 AM  
 BUN 9 12/05/2018 09:59 AM  
 Creatinine 0.8 12/05/2018 09:59 AM  
 Calcium 9.4 12/05/2018 09:59 AM  
 
 
Lab Results Component Value Date/Time Cholesterol, total 270 (H) 12/05/2018 09:59 AM  
 HDL Cholesterol 59 12/05/2018 09:59 AM  
 LDL, calculated 169 (H) 12/05/2018 09:59 AM  
 VLDL, calculated 43 (H) 12/05/2018 09:59 AM  
 Triglyceride 213 (H) 12/05/2018 09:59 AM  
 
 
Lab Results Component Value Date/Time Hemoglobin A1c 5.4 12/05/2018 09:59 AM  
 
 
Assessment/Plan: 1. Essential hypertension: Per BP readings today. Ordering amlodipine 5 mg daily. We discussed various pharmacotherapy options. Per the side effect profiles, she opted to go with amlodipine versus a diuretic. I encouraged her to check her blood pressure at home and to notify me if it is consistently greater than 140/90.  I encouraged her to reduce her weight by limiting her caloric intake. I will recheck her weight and her blood pressure at her follow-up appointment. I suspect her blood pressure elevation is secondary to weight gain within the past year. ORDERS: 
- amLODIPine (NORVASC) 5 mg tablet; Take 1 Tab by mouth daily. Dispense: 30 Tab; Refill: 6 
 
2. Sciatica and Migraine HAs:  
 I encouraged her to follow-up with Orthopedics. Refilling her type tizanidine at 4 mg 3 times daily as needed. Okay to continue with gabapentin for now. Activity as tolerated. Return to clinic if symptoms worsen. ORDERS: 
- tiZANidine (ZANAFLEX) 4 mg tablet; Take 1 Tab by mouth three (3) times daily. Dispense: 45 Tab; Refill: 6 3. Cough: Likely from allergic rhinitis. Her physical exam findings are reassuring.  I encouraged her to try Flonase for symptomatic relief. Return to clinic if symptoms do not resolve within 2-4 weeks. Return to clinic if symptoms worsen. 4.  Health Maintenance: Requesting her last FIT test. 
A mammogram was ordered to screen for breast cancer. She declined getting the flu vaccine today. 5.  Hyperlipidemia: LDL is 169.  Ordering pravastatin.   She is to take this medication in 2 weeks if she is tolerating the new blood pressure medication as mentioned above. Health Maintenance Due Topic Date Due  
 DTaP/Tdap/Td series (1 - Tdap) 03/07/1984  Shingrix Vaccine Age 50> (1 of 2) 03/07/2013  BREAST CANCER SCRN MAMMOGRAM  03/07/2013  FOBT Q 1 YEAR AGE 50-75  03/07/2013  Influenza Age 5 to Adult  08/01/2018 Lab review: labs are reviewed in the EHR I have discussed the diagnosis with the patient and the intended plan as seen in the above orders. The patient has received an after-visit summary and questions were answered concerning future plans. I have discussed medication side effects and warnings with the patient as well. I have reviewed the plan of care with the patient, accepted their input and they are in agreement with the treatment goals. All questions were answered. The patient understands the plan of care. Handouts provided today with above information. Pt instructed if symptoms worsen to call the office or report to the ED for continued care. Greater than 50% of the visit time was spent in counseling and/or coordination of care. Voice recognition was used to generate this report, which may have resulted in some phonetic based errors in grammar and contents. Even though attempts were made to correct all the mistakes, some may have been missed, and remained in the body of the document. Follow-up Disposition: 
Return in about 6 months (around 7/7/2019) for BP check, weight check.  
 
Phuong Lowery MD

## 2019-01-07 NOTE — PATIENT INSTRUCTIONS
Patient was given a copy of the Advanced Medical Directive Form, and understands to bring it in once completed. Health Maintenance Due Topic Date Due  
 DTaP/Tdap/Td series (1 - Tdap) 03/07/1984  Shingrix Vaccine Age 50> (1 of 2) 03/07/2013  BREAST CANCER SCRN MAMMOGRAM  03/07/2013  FOBT Q 1 YEAR AGE 50-75  03/07/2013  Influenza Age 5 to Adult  08/01/2018 Pravastatin (By mouth) Pravastatin (prav-a-STAT-in) Treats high cholesterol and triglyceride levels. May reduce the risk of heart attack, stroke, and related health conditions. This medicine is a statin. Brand Name(s): Pravachol There may be other brand names for this medicine. When This Medicine Should Not Be Used: This medicine is not right for everyone. Do not use it if you have had an allergic reaction to pravastatin, or you are pregnant or breastfeeding. How to Use This Medicine:  
Tablet · Take your medicine as directed. Your dose may need to be changed several times to find what works best for you. · Take this medicine in the evening or at bedtime, unless your doctor tells you differently. · Missed dose: Take a dose as soon as you remember. If it is almost time for your next dose, wait until then and take a regular dose. Do not take extra medicine to make up for a missed dose. · Store the medicine in a closed container at room temperature, away from heat, moisture, and direct light. Drugs and Foods to Avoid: Ask your doctor or pharmacist before using any other medicine, including over-the-counter medicines, vitamins, and herbal products. · Some foods and medicines can affect how pravastatin works. Tell your doctor if you are using azithromycin, boceprevir, cimetidine, clarithromycin, colchicine, cyclosporine, erythromycin, itraconazole, ketoconazole, niacin (vitamin B3), or spironolactone.  
· Tell your doctor if you are taking other medicine to lower your cholesterol level, including cholestyramine, colestipol, gemfibrozil, or fenofibrate. You may need to take the medicine 1 hour before or 4 hours after you take pravastatin. Warnings While Using This Medicine: · It is not safe to take this medicine during pregnancy. It could harm an unborn baby. Tell your doctor right away if you become pregnant. · Tell your doctor if you have kidney disease, liver disease, diabetes, epilepsy, muscle pain or weakness, or thyroid problems. Tell your doctor if you usually have more than 2 drinks of alcohol per day. · Tell any doctor or dentist who treats you that you are using this medicine. You may need to stop using this medicine several days before you have surgery or medical tests. · Your doctor will do lab tests at regular visits to check on the effects of this medicine. Keep all appointments. · Keep all medicine out of the reach of children. Never share your medicine with anyone. Possible Side Effects While Using This Medicine:  
Call your doctor right away if you notice any of these side effects: · Allergic reaction: Itching or hives, swelling in your face or hands, swelling or tingling in your mouth or throat, chest tightness, trouble breathing · Blistering, peeling, red skin rash · Dark urine or pale stools, diarrhea, nausea, vomiting, loss of appetite, pain in your upper stomach, yellow skin or eyes · Muscle pain, tenderness, or weakness · Unusual tiredness If you notice these less serious side effects, talk with your doctor:  
· Headache If you notice other side effects that you think are caused by this medicine, tell your doctor. Call your doctor for medical advice about side effects. You may report side effects to FDA at 1-267-FDA-7082 © 2017 [facility] Information is for End User's use only and may not be sold, redistributed or otherwise used for commercial purposes. The above information is an  only. It is not intended as medical advice for individual conditions or treatments. Talk to your doctor, nurse or pharmacist before following any medical regimen to see if it is safe and effective for you. Amlodipine (By mouth) Amlodipine (ig-AOW-su-peen) Treats high blood pressure and angina (chest pain). This medicine is a calcium channel blocker. Brand Name(s): Norvasc There may be other brand names for this medicine. When This Medicine Should Not Be Used: This medicine is not right for everyone. Do not use it if you had an allergic reaction to amlodipine. How to Use This Medicine:  
Tablet, Dissolving Tablet · Take your medicine as directed. Your dose may need to be changed several times to find what works best for you. Take this medicine at the same time each day. · Read and follow the patient instructions that come with this medicine. Talk to your doctor or pharmacist if you have any questions. · Missed dose: Take a dose as soon as you remember. If it has been more than 12 hours since you were supposed to take your dose, skip the missed dose and take your next regular dose at the regular time. · Store the medicine in a closed container at room temperature, away from heat, moisture, and direct light. Drugs and Foods to Avoid: Ask your doctor or pharmacist before using any other medicine, including over-the-counter medicines, vitamins, and herbal products. · Some medicines can affect how amlodipine works. Tell your doctor if you are also using any of the following: ¨ Clarithromycin, cyclosporine, diltiazem, itraconazole, ritonavir, sildenafil, simvastatin, tacrolimus Warnings While Using This Medicine: · Tell your doctor if you are pregnant or breastfeeding, or if you have liver disease, heart disease, coronary artery disease, or aortic stenosis.  
· This medicine could lower your blood pressure too much, especially when you first use it or if you are dehydrated. Stand or sit up slowly if you feel lightheaded or dizzy. · Your doctor will check your progress and the effects of this medicine at regular visits. Keep all appointments. · Do not stop using this medicine without asking your doctor, even if you feel well. This medicine will not cure high blood pressure, but it will help keep it in normal range. You may have to take blood pressure medicine for the rest of your life. · Keep all medicine out of the reach of children. Never share your medicine with anyone. Possible Side Effects While Using This Medicine:  
Call your doctor right away if you notice any of these side effects: · Allergic reaction: Itching or hives, swelling in your face or hands, swelling or tingling in your mouth or throat, chest tightness, trouble breathing · Lightheadedness, dizziness · New or worsening chest pain · Swelling in your hands, ankles, or legs · Trouble breathing, nausea, unusual sweating, fainting If you notice other side effects that you think are caused by this medicine, tell your doctor. Call your doctor for medical advice about side effects. You may report side effects to FDA at 4-645-FDA-4119 © 2017 [facility] Information is for End User's use only and may not be sold, redistributed or otherwise used for commercial purposes. The above information is an  only. It is not intended as medical advice for individual conditions or treatments. Talk to your doctor, nurse or pharmacist before following any medical regimen to see if it is safe and effective for you.

## 2019-01-08 PROBLEM — I10 ESSENTIAL HYPERTENSION: Status: ACTIVE | Noted: 2019-01-08

## 2019-07-25 DIAGNOSIS — E78.2 MIXED HYPERLIPIDEMIA: ICD-10-CM

## 2019-07-26 RX ORDER — PRAVASTATIN SODIUM 20 MG/1
20 TABLET ORAL DAILY
Qty: 30 TAB | Refills: 2 | Status: SHIPPED | OUTPATIENT
Start: 2019-07-26 | End: 2019-10-19 | Stop reason: SDUPTHER

## 2019-07-26 NOTE — TELEPHONE ENCOUNTER
----- Message from Roe Ray MD sent at 7/26/2019  7:19 AM EDT -----  Regarding: F/u apt needed  Please schedule for a 30 min apt for med refills in October. I will refill her rx until then.     Respectfully,  Dr. Basilio   [facility], [address] [place] [address], [address] [place] [address] Str.  Phone: [phone]  Fax: [phone]

## 2020-12-22 NOTE — PROGRESS NOTES
Chief Complaint Patient presents with  Elevated Blood Pressure  
  follow up  Labs  
  done 12-05-18 1. Have you been to the ER, urgent care clinic since your last visit? Hospitalized since your last visit? No 
 
2. Have you seen or consulted any other health care providers outside of the [address] [place] [address] since your last visit? Include any pap smears or colon screening. No 
 
Patient was given a copy of the Advanced Directive and understands to bring it in once completed. Health Maintenance Due Topic Date Due  
 DTaP/Tdap/Td series (1 - Tdap) 03/07/1984  Shingrix Vaccine Age 50> (1 of 2) 03/07/2013  BREAST CANCER SCRN MAMMOGRAM  03/07/2013  FOBT Q 1 YEAR AGE 50-75  03/07/2013  Influenza Age 5 to Adult  08/01/2018 No